# Patient Record
Sex: FEMALE
[De-identification: names, ages, dates, MRNs, and addresses within clinical notes are randomized per-mention and may not be internally consistent; named-entity substitution may affect disease eponyms.]

---

## 2019-08-19 ENCOUNTER — APPOINTMENT (OUTPATIENT)
Dept: NEUROLOGY | Facility: CLINIC | Age: 29
End: 2019-08-19
Payer: COMMERCIAL

## 2019-08-19 ENCOUNTER — LABORATORY RESULT (OUTPATIENT)
Age: 29
End: 2019-08-19

## 2019-08-19 VITALS
SYSTOLIC BLOOD PRESSURE: 116 MMHG | OXYGEN SATURATION: 99 % | WEIGHT: 132 LBS | HEIGHT: 62 IN | DIASTOLIC BLOOD PRESSURE: 82 MMHG | BODY MASS INDEX: 24.29 KG/M2 | HEART RATE: 92 BPM

## 2019-08-19 PROCEDURE — 99205 OFFICE O/P NEW HI 60 MIN: CPT

## 2019-08-19 PROCEDURE — 99354: CPT

## 2019-08-21 LAB
25(OH)D3 SERPL-MCNC: 19.9 NG/ML
ACE BLD-CCNC: 28 U/L
ALBUMIN SERPL ELPH-MCNC: 4.7 G/DL
ALP BLD-CCNC: 59 U/L
ALT SERPL-CCNC: 11 U/L
ANA SER IF-ACNC: NEGATIVE
ANION GAP SERPL CALC-SCNC: 11 MMOL/L
APTT BLD: 32 SEC
AST SERPL-CCNC: 12 U/L
B BURGDOR IGG+IGM SER QL IB: NORMAL
BASOPHILS # BLD AUTO: 0.06 K/UL
BASOPHILS NFR BLD AUTO: 0.7 %
BILIRUB SERPL-MCNC: 0.3 MG/DL
BUN SERPL-MCNC: 11 MG/DL
CALCIUM SERPL-MCNC: 9.7 MG/DL
CHLORIDE SERPL-SCNC: 105 MMOL/L
CO2 SERPL-SCNC: 26 MMOL/L
CREAT SERPL-MCNC: 0.66 MG/DL
CRP SERPL-MCNC: <0.1 MG/DL
DSDNA AB SER-ACNC: <12 IU/ML
ENA SS-A AB SER IA-ACNC: <0.2 AL
ENA SS-B AB SER IA-ACNC: <0.2 AL
EOSINOPHIL # BLD AUTO: 0.19 K/UL
EOSINOPHIL NFR BLD AUTO: 2.2 %
ERYTHROCYTE [SEDIMENTATION RATE] IN BLOOD BY WESTERGREN METHOD: 9 MM/HR
FOLATE SERPL-MCNC: 8.5 NG/ML
GLUCOSE SERPL-MCNC: 106 MG/DL
HCT VFR BLD CALC: 34.4 %
HGB BLD-MCNC: 9.9 G/DL
IMM GRANULOCYTES NFR BLD AUTO: 0.3 %
INR PPP: 0.92 RATIO
LYMPHOCYTES # BLD AUTO: 2.17 K/UL
LYMPHOCYTES NFR BLD AUTO: 24.9 %
MAN DIFF?: NORMAL
MCHC RBC-ENTMCNC: 19.7 PG
MCHC RBC-ENTMCNC: 28.8 GM/DL
MCV RBC AUTO: 68.4 FL
MONOCYTES # BLD AUTO: 0.46 K/UL
MONOCYTES NFR BLD AUTO: 5.3 %
NEUTROPHILS # BLD AUTO: 5.82 K/UL
NEUTROPHILS NFR BLD AUTO: 66.6 %
PLATELET # BLD AUTO: 298 K/UL
POTASSIUM SERPL-SCNC: 4.3 MMOL/L
PROT SERPL-MCNC: 7.6 G/DL
PT BLD: 10.5 SEC
RBC # BLD: 5.03 M/UL
RBC # FLD: 16.9 %
SODIUM SERPL-SCNC: 142 MMOL/L
TSH SERPL-ACNC: 1.32 UIU/ML
VIT B12 SERPL-MCNC: 309 PG/ML
VZV AB TITR SER: POSITIVE
VZV IGG SER IF-ACNC: 1614 INDEX
WBC # FLD AUTO: 8.73 K/UL

## 2019-08-22 LAB
M TB IFN-G BLD-IMP: NEGATIVE
QUANTIFERON TB PLUS MITOGEN MINUS NIL: >10 IU/ML
QUANTIFERON TB PLUS NIL: 0.02 IU/ML
QUANTIFERON TB PLUS TB1 MINUS NIL: 0 IU/ML
QUANTIFERON TB PLUS TB2 MINUS NIL: 0 IU/ML

## 2019-08-23 LAB — AQP4 H2O CHANNEL AB SERPL IA-ACNC: NEGATIVE

## 2019-08-30 ENCOUNTER — TRANSCRIPTION ENCOUNTER (OUTPATIENT)
Age: 29
End: 2019-08-30

## 2019-09-03 LAB
JCV INDEX: 3.87
MISCELLANEOUS TEST: NORMAL
PROC NAME: NORMAL
STRATIFY JCV ANTIBODY: POSITIVE

## 2019-09-04 ENCOUNTER — FORM ENCOUNTER (OUTPATIENT)
Age: 29
End: 2019-09-04

## 2019-09-05 ENCOUNTER — OUTPATIENT (OUTPATIENT)
Dept: OUTPATIENT SERVICES | Facility: HOSPITAL | Age: 29
LOS: 1 days | End: 2019-09-05

## 2019-09-05 ENCOUNTER — APPOINTMENT (OUTPATIENT)
Dept: MRI IMAGING | Facility: CLINIC | Age: 29
End: 2019-09-05
Payer: COMMERCIAL

## 2019-09-05 PROCEDURE — 72157 MRI CHEST SPINE W/O & W/DYE: CPT | Mod: 26

## 2019-09-05 PROCEDURE — 72156 MRI NECK SPINE W/O & W/DYE: CPT | Mod: 26

## 2020-06-25 ENCOUNTER — APPOINTMENT (OUTPATIENT)
Dept: MRI IMAGING | Facility: CLINIC | Age: 30
End: 2020-06-25
Payer: COMMERCIAL

## 2020-06-25 ENCOUNTER — OUTPATIENT (OUTPATIENT)
Dept: OUTPATIENT SERVICES | Facility: HOSPITAL | Age: 30
LOS: 1 days | End: 2020-06-25

## 2020-06-25 ENCOUNTER — APPOINTMENT (OUTPATIENT)
Dept: MRI IMAGING | Facility: CLINIC | Age: 30
End: 2020-06-25

## 2020-06-25 ENCOUNTER — APPOINTMENT (OUTPATIENT)
Dept: INTERNAL MEDICINE | Facility: CLINIC | Age: 30
End: 2020-06-25

## 2020-06-25 ENCOUNTER — RESULT REVIEW (OUTPATIENT)
Age: 30
End: 2020-06-25

## 2020-06-25 PROCEDURE — 70553 MRI BRAIN STEM W/O & W/DYE: CPT | Mod: 26

## 2020-06-26 ENCOUNTER — APPOINTMENT (OUTPATIENT)
Dept: INTERNAL MEDICINE | Facility: CLINIC | Age: 30
End: 2020-06-26
Payer: COMMERCIAL

## 2020-06-26 ENCOUNTER — LABORATORY RESULT (OUTPATIENT)
Age: 30
End: 2020-06-26

## 2020-06-26 ENCOUNTER — APPOINTMENT (OUTPATIENT)
Dept: NEUROLOGY | Facility: CLINIC | Age: 30
End: 2020-06-26
Payer: COMMERCIAL

## 2020-06-26 VITALS
HEIGHT: 62 IN | DIASTOLIC BLOOD PRESSURE: 77 MMHG | OXYGEN SATURATION: 99 % | HEART RATE: 111 BPM | BODY MASS INDEX: 24.11 KG/M2 | SYSTOLIC BLOOD PRESSURE: 112 MMHG | TEMPERATURE: 98 F | WEIGHT: 131 LBS

## 2020-06-26 VITALS
SYSTOLIC BLOOD PRESSURE: 121 MMHG | TEMPERATURE: 98 F | HEART RATE: 95 BPM | HEIGHT: 62 IN | DIASTOLIC BLOOD PRESSURE: 76 MMHG | WEIGHT: 131 LBS | OXYGEN SATURATION: 100 % | BODY MASS INDEX: 24.11 KG/M2

## 2020-06-26 DIAGNOSIS — Z11.59 ENCOUNTER FOR SCREENING FOR OTHER VIRAL DISEASES: ICD-10-CM

## 2020-06-26 PROCEDURE — 99385 PREV VISIT NEW AGE 18-39: CPT | Mod: 25

## 2020-06-26 PROCEDURE — 36415 COLL VENOUS BLD VENIPUNCTURE: CPT

## 2020-06-26 PROCEDURE — 99215 OFFICE O/P EST HI 40 MIN: CPT

## 2020-06-26 NOTE — HISTORY OF PRESENT ILLNESS
[de-identified] : 2019: dizziness, blurry vision after exercise and hot shower.\par \par New L hand weakness 2 weeks ago. Yesterday after carrying heavy bag, pain in L hand. Also tingling in posterior calves after shower. Also recently L hand seems subtlely weaker [FreeTextEntry1] : Establish care, needs referrals

## 2020-06-26 NOTE — HISTORY OF PRESENT ILLNESS
[FreeTextEntry1] : Initial hx 8/2019\par 5-6y ago - while in school, noted asymptomatic L optic pallor, found OCT thinning. Never had prolonged episode of visual impairment. For past several months, transient hazy vision out of L eye after showering or when it's hot outside. Believes this happened several years ago when working out. this lasts about 30 min at a time, improves when it cools down. \par While vacationing in [facility] 1.5 months ago, saw neurologist and had MRI brain which showed "active lesion".\par 2 months of imbalance and generalized weakness. \par Some feeling of incomplete emptying of bladder.\par Occ deliberate speech.\par 6/2020 - Feeling like fine motor skills are off in L hand for 2-3wks. Some weakness of the left hand. She is left hand. No trouble walking. No BB dysfunction.\par \par \par Subj interval:\par \par 6/2020 - Feeling like fine motor skills are off in L hand for 2-3wks. Some weakness of the left hand. She is left hand. No trouble walking. No BB dysfunction.\par \par Occ paresthesias in LLE.\par \par PMHX:\par none\par \par MEDS:\par none\par \par FHx: sister had episode of acute vision loss and got steroids and improved, found to have brain "plaques".\par \par O:\par \par AO3. Normally conversant. Follows commands, names, and repeats. Good attention.\par \par PERRL, VFF, EOMI, no nystagmus, face symmetric, TUP at midline.\par \par Motor: \par    R:  L:\par Del   5  5\par Bi   5  5\par Tri   5  5\par Wrist Extensors  5  5\par Finger abductors  5  5\par    5  5 \par \par HF   5  5\par KE   5  5\par KF   5  5\par DF   5  5\par PF   5  5\par \par Tone   R  L\par UE   0  0 \par LE   0  0\par \par Sensory  RUE  LUE  RLE LLE \par LT   +  +  +  +\par Vib   +  +  +  +\par JPS   +  +  +  +\par PP   +  +  +  +\par Temp   +  +  +  +\par \par Reflexes:\par    R  L  \par Biceps   2  2\par BR   2  2\par Triceps  2  2\par Pat   2  2 \par AJ   2  2\par \par TOES   F  F\par \par Coordination:\par    R  L  \par FTN   0  0 \par ЮЛИЯ   0  0\par HTS   0  0 \par \par Other     \par  \par Gait: normal, can heel, toe, tandem\par \par   Assistance: none\par \par \par MRI brain 7/2019 - at least one DEBORA lesion, one occipital horn PV lesion, and one clear L pontine brain lesion. Pontine lesion was read as having enhancement but I'm not sure as this may be artifact. reviewed with neuroradiology and agree that L pontine lesion is not clearly jamie+.\par \par MRI brain 6/2020 - unchanged.\par \par MRI C+T spine 9/2019 - left posterior jamie-negative t2h at T10. no other cord lesions.\par \par \par AP: Likely mild MS, based on subtle optic neuropathy starting several years ago, episode of imbalance that may be CIS, and MRI meeting DIS (DEBORA, PV, SC, and PF).\par \par New episode of subtle L arm motor impairment could be indicative of new relapse, though exam was normal.\par \par All questions answered, education provided, discussed management plan at length.\par \par - vitD 5ku daily\par - MRI C+T\par - RTC after MRIs

## 2020-06-26 NOTE — HISTORY OF PRESENT ILLNESS
[de-identified] : 2019: dizziness, blurry vision after exercise and hot shower.\par \par New L hand weakness 2 weeks ago. Yesterday after carrying heavy bag, pain in L hand. Also tingling in posterior calves after shower. Also recently L hand seems subtlely weaker [FreeTextEntry1] : Establish care, needs referrals

## 2020-06-30 ENCOUNTER — RESULT REVIEW (OUTPATIENT)
Age: 30
End: 2020-06-30

## 2020-07-24 ENCOUNTER — RESULT REVIEW (OUTPATIENT)
Age: 30
End: 2020-07-24

## 2020-07-24 ENCOUNTER — OUTPATIENT (OUTPATIENT)
Dept: OUTPATIENT SERVICES | Facility: HOSPITAL | Age: 30
LOS: 1 days | End: 2020-07-24

## 2020-07-24 ENCOUNTER — APPOINTMENT (OUTPATIENT)
Dept: MRI IMAGING | Facility: CLINIC | Age: 30
End: 2020-07-24
Payer: COMMERCIAL

## 2020-07-24 PROCEDURE — 72156 MRI NECK SPINE W/O & W/DYE: CPT | Mod: 26

## 2020-07-24 PROCEDURE — 72157 MRI CHEST SPINE W/O & W/DYE: CPT | Mod: 26

## 2020-08-03 LAB
25(OH)D3 SERPL-MCNC: 23.2 NG/ML
ALBUMIN SERPL ELPH-MCNC: 4.5 G/DL
ALP BLD-CCNC: 51 U/L
ALT SERPL-CCNC: 14 U/L
ANA SER IF-ACNC: NEGATIVE
ANION GAP SERPL CALC-SCNC: 14 MMOL/L
APPEARANCE: CLEAR
AST SERPL-CCNC: 14 U/L
BACTERIA: ABNORMAL
BASOPHILS # BLD AUTO: 0.2 K/UL
BASOPHILS NFR BLD AUTO: 2.7 %
BILIRUB SERPL-MCNC: 0.3 MG/DL
BILIRUBIN URINE: NEGATIVE
BLOOD URINE: NEGATIVE
BUN SERPL-MCNC: 11 MG/DL
CALCIUM SERPL-MCNC: 9.2 MG/DL
CHLORIDE SERPL-SCNC: 106 MMOL/L
CHOLEST SERPL-MCNC: 152 MG/DL
CHOLEST/HDLC SERPL: 2.5 RATIO
CO2 SERPL-SCNC: 20 MMOL/L
COLOR: NORMAL
CREAT SERPL-MCNC: 0.66 MG/DL
CRP SERPL-MCNC: <0.1 MG/DL
EOSINOPHIL # BLD AUTO: 0.26 K/UL
EOSINOPHIL NFR BLD AUTO: 3.5 %
ERYTHROCYTE [SEDIMENTATION RATE] IN BLOOD BY WESTERGREN METHOD: 10 MM/HR
ESTIMATED AVERAGE GLUCOSE: 100 MG/DL
FERRITIN SERPL-MCNC: 4 NG/ML
FOLATE SERPL-MCNC: 7.8 NG/ML
GLUCOSE QUALITATIVE U: NEGATIVE
GLUCOSE SERPL-MCNC: 87 MG/DL
HBA1C MFR BLD HPLC: 5.1 %
HCT VFR BLD CALC: 30.4 %
HDLC SERPL-MCNC: 62 MG/DL
HGB BLD-MCNC: 8.3 G/DL
HYALINE CASTS: 2 /LPF
IRON SATN MFR SERPL: 2 %
IRON SERPL-MCNC: 11 UG/DL
KETONES URINE: ABNORMAL
LDLC SERPL CALC-MCNC: 79 MG/DL
LEUKOCYTE ESTERASE URINE: NEGATIVE
LYMPHOCYTES # BLD AUTO: 1.37 K/UL
LYMPHOCYTES NFR BLD AUTO: 18.4 %
MAN DIFF?: NORMAL
MCHC RBC-ENTMCNC: 18.5 PG
MCHC RBC-ENTMCNC: 27.3 GM/DL
MCV RBC AUTO: 67.9 FL
MICROSCOPIC-UA: NORMAL
MONOCYTES # BLD AUTO: 0.19 K/UL
MONOCYTES NFR BLD AUTO: 2.6 %
NEUTROPHILS # BLD AUTO: 5.44 K/UL
NEUTROPHILS NFR BLD AUTO: 72.8 %
NITRITE URINE: NEGATIVE
PH URINE: 5.5
PLATELET # BLD AUTO: 231 K/UL
POTASSIUM SERPL-SCNC: 4.2 MMOL/L
PROT SERPL-MCNC: 7.1 G/DL
PROTEIN URINE: NEGATIVE
RBC # BLD: 4.48 M/UL
RBC # FLD: 17.7 %
RED BLOOD CELLS URINE: 2 /HPF
RHEUMATOID FACT SER QL: <10 IU/ML
SARS-COV-2 IGG SERPL IA-ACNC: 0.11 INDEX
SARS-COV-2 IGG SERPL QL IA: NEGATIVE
SODIUM SERPL-SCNC: 140 MMOL/L
SPECIFIC GRAVITY URINE: 1.02
SQUAMOUS EPITHELIAL CELLS: 4 /HPF
THYROGLOB AB SERPL-ACNC: <20 IU/ML
THYROPEROXIDASE AB SERPL IA-ACNC: <10 IU/ML
TIBC SERPL-MCNC: 445 UG/DL
TRIGL SERPL-MCNC: 54 MG/DL
TSH SERPL-ACNC: 0.81 UIU/ML
UIBC SERPL-MCNC: 435 UG/DL
UROBILINOGEN URINE: NORMAL
VIT B12 SERPL-MCNC: 242 PG/ML
WBC # FLD AUTO: 7.47 K/UL
WHITE BLOOD CELLS URINE: 3 /HPF

## 2021-10-13 ENCOUNTER — APPOINTMENT (OUTPATIENT)
Dept: INTERNAL MEDICINE | Facility: CLINIC | Age: 31
End: 2021-10-13

## 2021-11-02 ENCOUNTER — TRANSCRIPTION ENCOUNTER (OUTPATIENT)
Age: 31
End: 2021-11-02

## 2021-11-10 ENCOUNTER — APPOINTMENT (OUTPATIENT)
Dept: INTERNAL MEDICINE | Facility: CLINIC | Age: 31
End: 2021-11-10
Payer: COMMERCIAL

## 2021-11-10 VITALS
WEIGHT: 134 LBS | BODY MASS INDEX: 24.66 KG/M2 | SYSTOLIC BLOOD PRESSURE: 112 MMHG | HEIGHT: 62 IN | DIASTOLIC BLOOD PRESSURE: 75 MMHG | OXYGEN SATURATION: 99 % | HEART RATE: 89 BPM | TEMPERATURE: 98.3 F

## 2021-11-10 DIAGNOSIS — Z82.49 FAMILY HISTORY OF ISCHEMIC HEART DISEASE AND OTHER DISEASES OF THE CIRCULATORY SYSTEM: ICD-10-CM

## 2021-11-10 DIAGNOSIS — R23.8 OTHER SKIN CHANGES: ICD-10-CM

## 2021-11-10 DIAGNOSIS — Z23 ENCOUNTER FOR IMMUNIZATION: ICD-10-CM

## 2021-11-10 DIAGNOSIS — G37.9 DEMYELINATING DISEASE OF CENTRAL NERVOUS SYSTEM, UNSPECIFIED: ICD-10-CM

## 2021-11-10 DIAGNOSIS — R29.898 OTHER SYMPTOMS AND SIGNS INVOLVING THE MUSCULOSKELETAL SYSTEM: ICD-10-CM

## 2021-11-10 DIAGNOSIS — Z82.0 FAMILY HISTORY OF EPILEPSY AND OTHER DISEASES OF THE NERVOUS SYSTEM: ICD-10-CM

## 2021-11-10 DIAGNOSIS — Z78.9 OTHER SPECIFIED HEALTH STATUS: ICD-10-CM

## 2021-11-10 PROCEDURE — 99395 PREV VISIT EST AGE 18-39: CPT | Mod: 25

## 2021-11-10 PROCEDURE — 36415 COLL VENOUS BLD VENIPUNCTURE: CPT

## 2021-11-10 PROCEDURE — G0008: CPT

## 2021-11-10 PROCEDURE — 90686 IIV4 VACC NO PRSV 0.5 ML IM: CPT

## 2021-11-24 ENCOUNTER — APPOINTMENT (OUTPATIENT)
Dept: MRI IMAGING | Facility: CLINIC | Age: 31
End: 2021-11-24
Payer: COMMERCIAL

## 2021-11-24 ENCOUNTER — MED ADMIN CHARGE (OUTPATIENT)
Age: 31
End: 2021-11-24

## 2021-11-24 ENCOUNTER — APPOINTMENT (OUTPATIENT)
Dept: INTERNAL MEDICINE | Facility: CLINIC | Age: 31
End: 2021-11-24
Payer: COMMERCIAL

## 2021-11-24 ENCOUNTER — OUTPATIENT (OUTPATIENT)
Dept: OUTPATIENT SERVICES | Facility: HOSPITAL | Age: 31
LOS: 1 days | End: 2021-11-24

## 2021-11-24 ENCOUNTER — TRANSCRIPTION ENCOUNTER (OUTPATIENT)
Age: 31
End: 2021-11-24

## 2021-11-24 DIAGNOSIS — Z23 ENCOUNTER FOR IMMUNIZATION: ICD-10-CM

## 2021-11-24 LAB
25(OH)D3 SERPL-MCNC: 21.1 NG/ML
ALBUMIN SERPL ELPH-MCNC: 4.4 G/DL
ALP BLD-CCNC: 57 U/L
ALT SERPL-CCNC: 11 U/L
ANION GAP SERPL CALC-SCNC: 12 MMOL/L
APPEARANCE: CLEAR
APTT BLD: 32.5 SEC
AST SERPL-CCNC: 16 U/L
BACTERIA: ABNORMAL
BASOPHILS # BLD AUTO: 0.06 K/UL
BASOPHILS NFR BLD AUTO: 0.7 %
BILIRUB SERPL-MCNC: 0.4 MG/DL
BILIRUBIN URINE: NEGATIVE
BLOOD URINE: NEGATIVE
BUN SERPL-MCNC: 8 MG/DL
CALCIUM SERPL-MCNC: 9.2 MG/DL
CHLORIDE SERPL-SCNC: 103 MMOL/L
CHOLEST SERPL-MCNC: 172 MG/DL
CO2 SERPL-SCNC: 22 MMOL/L
COLOR: NORMAL
CREAT SERPL-MCNC: 0.59 MG/DL
CRP SERPL-MCNC: <3 MG/L
EOSINOPHIL # BLD AUTO: 0.2 K/UL
EOSINOPHIL NFR BLD AUTO: 2.4 %
ERYTHROCYTE [SEDIMENTATION RATE] IN BLOOD BY WESTERGREN METHOD: 7 MM/HR
ESTIMATED AVERAGE GLUCOSE: 97 MG/DL
FERRITIN SERPL-MCNC: 3 NG/ML
FOLATE SERPL-MCNC: 11.6 NG/ML
GLUCOSE QUALITATIVE U: NEGATIVE
GLUCOSE SERPL-MCNC: 80 MG/DL
HBA1C MFR BLD HPLC: 5 %
HCT VFR BLD CALC: 28.9 %
HDLC SERPL-MCNC: 77 MG/DL
HGB BLD-MCNC: 7.8 G/DL
HYALINE CASTS: 2 /LPF
IMM GRANULOCYTES NFR BLD AUTO: 0.4 %
INR PPP: 1.05 RATIO
IRON SATN MFR SERPL: 4 %
IRON SERPL-MCNC: 16 UG/DL
KETONES URINE: NORMAL
LDLC SERPL CALC-MCNC: 85 MG/DL
LEUKOCYTE ESTERASE URINE: NEGATIVE
LYMPHOCYTES # BLD AUTO: 2.11 K/UL
LYMPHOCYTES NFR BLD AUTO: 25 %
MAN DIFF?: NORMAL
MCHC RBC-ENTMCNC: 17.6 PG
MCHC RBC-ENTMCNC: 27 GM/DL
MCV RBC AUTO: 65.1 FL
MICROSCOPIC-UA: NORMAL
MONOCYTES # BLD AUTO: 0.53 K/UL
MONOCYTES NFR BLD AUTO: 6.3 %
NEUTROPHILS # BLD AUTO: 5.52 K/UL
NEUTROPHILS NFR BLD AUTO: 65.2 %
NITRITE URINE: NEGATIVE
NONHDLC SERPL-MCNC: 94 MG/DL
PH URINE: 6
PLATELET # BLD AUTO: 289 K/UL
POTASSIUM SERPL-SCNC: 3.9 MMOL/L
PROT SERPL-MCNC: 7 G/DL
PROTEIN URINE: NEGATIVE
PT BLD: 12.4 SEC
RBC # BLD: 4.44 M/UL
RBC # FLD: 20.6 %
RED BLOOD CELLS URINE: 1 /HPF
SODIUM SERPL-SCNC: 136 MMOL/L
SPECIFIC GRAVITY URINE: 1.02
SQUAMOUS EPITHELIAL CELLS: 5 /HPF
TIBC SERPL-MCNC: 424 UG/DL
TRIGL SERPL-MCNC: 48 MG/DL
TSH SERPL-ACNC: 1.1 UIU/ML
UIBC SERPL-MCNC: 408 UG/DL
UROBILINOGEN URINE: NORMAL
VIT B12 SERPL-MCNC: 272 PG/ML
WBC # FLD AUTO: 8.45 K/UL
WHITE BLOOD CELLS URINE: 2 /HPF

## 2021-11-24 PROCEDURE — 72156 MRI NECK SPINE W/O & W/DYE: CPT | Mod: 26

## 2021-11-24 PROCEDURE — 72157 MRI CHEST SPINE W/O & W/DYE: CPT | Mod: 26

## 2021-11-24 PROCEDURE — 70553 MRI BRAIN STEM W/O & W/DYE: CPT | Mod: 26

## 2021-11-24 PROCEDURE — 90715 TDAP VACCINE 7 YRS/> IM: CPT

## 2021-11-24 PROCEDURE — 90471 IMMUNIZATION ADMIN: CPT

## 2021-11-29 ENCOUNTER — NON-APPOINTMENT (OUTPATIENT)
Age: 31
End: 2021-11-29

## 2022-02-15 ENCOUNTER — APPOINTMENT (OUTPATIENT)
Dept: OBGYN | Facility: CLINIC | Age: 32
End: 2022-02-15

## 2022-04-06 ENCOUNTER — APPOINTMENT (OUTPATIENT)
Dept: DERMATOLOGY | Facility: CLINIC | Age: 32
End: 2022-04-06
Payer: COMMERCIAL

## 2022-04-06 ENCOUNTER — LABORATORY RESULT (OUTPATIENT)
Age: 32
End: 2022-04-06

## 2022-04-06 DIAGNOSIS — D23.9 OTHER BENIGN NEOPLASM OF SKIN, UNSPECIFIED: ICD-10-CM

## 2022-04-06 DIAGNOSIS — Z12.83 ENCOUNTER FOR SCREENING FOR MALIGNANT NEOPLASM OF SKIN: ICD-10-CM

## 2022-04-06 DIAGNOSIS — D22.9 MELANOCYTIC NEVI, UNSPECIFIED: ICD-10-CM

## 2022-04-06 DIAGNOSIS — D48.7 NEOPLASM OF UNCERTAIN BEHAVIOR OF OTHER SPECIFIED SITES: ICD-10-CM

## 2022-04-06 PROCEDURE — 11102 TANGNTL BX SKIN SINGLE LES: CPT

## 2022-04-06 PROCEDURE — 99203 OFFICE O/P NEW LOW 30 MIN: CPT | Mod: 25

## 2022-04-06 NOTE — PHYSICAL EXAM
[Alert] : alert [Oriented x 3] : ~L oriented x 3 [Well Nourished] : well nourished [Conjunctiva Non-injected] : conjunctiva non-injected [No Visual Lymphadenopathy] : no visual  lymphadenopathy [No Clubbing] : no clubbing [No Edema] : no edema [No Bromhidrosis] : no bromhidrosis [No Chromhidrosis] : no chromhidrosis [Full Body Skin Exam Performed] : performed [FreeTextEntry3] : white skin\par numerous brown macules and papules\par L thigh white scar without pigment\par buttocks without appreciable hypopigmentation\par medial lower back asymmetric oval brown papule\par

## 2022-04-06 NOTE — HISTORY OF PRESENT ILLNESS
[FreeTextEntry1] : moles [de-identified] : new patient\par DN L thigh shave removal a few years ago, not excised (but told to FU if grows back)\par spot on buttock that wont tan\par no FH melanoma

## 2022-04-20 ENCOUNTER — NON-APPOINTMENT (OUTPATIENT)
Age: 32
End: 2022-04-20

## 2022-04-24 ENCOUNTER — TRANSCRIPTION ENCOUNTER (OUTPATIENT)
Age: 32
End: 2022-04-24

## 2023-01-19 RX ORDER — CHLORHEXIDINE GLUCONATE 4 %
325 (65 FE) LIQUID (ML) TOPICAL
Qty: 180 | Refills: 1 | Status: ACTIVE | COMMUNITY
Start: 2019-09-19 | End: 1900-01-01

## 2023-03-06 ENCOUNTER — APPOINTMENT (OUTPATIENT)
Dept: MRI IMAGING | Facility: CLINIC | Age: 33
End: 2023-03-06
Payer: COMMERCIAL

## 2023-03-06 ENCOUNTER — OUTPATIENT (OUTPATIENT)
Dept: OUTPATIENT SERVICES | Facility: HOSPITAL | Age: 33
LOS: 1 days | End: 2023-03-06

## 2023-03-06 PROCEDURE — 72141 MRI NECK SPINE W/O DYE: CPT | Mod: 26

## 2023-03-06 PROCEDURE — 70551 MRI BRAIN STEM W/O DYE: CPT | Mod: 26

## 2023-03-06 PROCEDURE — 72146 MRI CHEST SPINE W/O DYE: CPT | Mod: 26

## 2023-03-09 ENCOUNTER — NON-APPOINTMENT (OUTPATIENT)
Age: 33
End: 2023-03-09

## 2023-03-15 ENCOUNTER — APPOINTMENT (OUTPATIENT)
Dept: INTERNAL MEDICINE | Facility: CLINIC | Age: 33
End: 2023-03-15
Payer: COMMERCIAL

## 2023-03-15 ENCOUNTER — NON-APPOINTMENT (OUTPATIENT)
Age: 33
End: 2023-03-15

## 2023-03-15 VITALS
OXYGEN SATURATION: 96 % | TEMPERATURE: 98.4 F | HEART RATE: 85 BPM | SYSTOLIC BLOOD PRESSURE: 125 MMHG | WEIGHT: 129 LBS | DIASTOLIC BLOOD PRESSURE: 76 MMHG | HEIGHT: 62 IN | BODY MASS INDEX: 23.74 KG/M2

## 2023-03-15 DIAGNOSIS — H46.9 UNSPECIFIED OPTIC NEURITIS: ICD-10-CM

## 2023-03-15 PROCEDURE — 99395 PREV VISIT EST AGE 18-39: CPT | Mod: 25

## 2023-03-15 PROCEDURE — 36415 COLL VENOUS BLD VENIPUNCTURE: CPT

## 2023-03-15 RX ORDER — TRETINOIN 0.25 MG/G
0.03 CREAM TOPICAL
Qty: 1 | Refills: 1 | Status: ACTIVE | COMMUNITY
Start: 2023-03-15 | End: 1900-01-01

## 2023-03-18 ENCOUNTER — APPOINTMENT (OUTPATIENT)
Dept: MRI IMAGING | Facility: CLINIC | Age: 33
End: 2023-03-18

## 2023-03-22 ENCOUNTER — APPOINTMENT (OUTPATIENT)
Dept: NEUROLOGY | Facility: CLINIC | Age: 33
End: 2023-03-22
Payer: COMMERCIAL

## 2023-03-22 ENCOUNTER — NON-APPOINTMENT (OUTPATIENT)
Age: 33
End: 2023-03-22

## 2023-03-22 VITALS
WEIGHT: 129 LBS | SYSTOLIC BLOOD PRESSURE: 109 MMHG | HEART RATE: 98 BPM | TEMPERATURE: 98.3 F | DIASTOLIC BLOOD PRESSURE: 77 MMHG | OXYGEN SATURATION: 100 % | HEIGHT: 62 IN | BODY MASS INDEX: 23.74 KG/M2

## 2023-03-22 PROCEDURE — 99215 OFFICE O/P EST HI 40 MIN: CPT

## 2023-03-22 RX ORDER — VITAMIN K2 90 MCG
125 MCG CAPSULE ORAL
Qty: 30 | Refills: 5 | Status: ACTIVE | COMMUNITY
Start: 2023-03-22 | End: 1900-01-01

## 2023-03-22 RX ORDER — CHLORHEXIDINE GLUCONATE 4 %
1000 LIQUID (ML) TOPICAL DAILY
Qty: 30 | Refills: 3 | Status: ACTIVE | COMMUNITY
Start: 2023-03-22 | End: 1900-01-01

## 2023-03-23 LAB
ANA SER IF-ACNC: NEGATIVE
ENA SS-A AB SER IA-ACNC: <0.2 AL
ENA SS-B AB SER IA-ACNC: <0.2 AL
HBV CORE IGG+IGM SER QL: NONREACTIVE
HBV SURFACE AB SER QL: REACTIVE
HBV SURFACE AG SER QL: NONREACTIVE
VZV AB TITR SER: POSITIVE
VZV IGG SER IF-ACNC: 1289 INDEX

## 2023-03-24 LAB — DSDNA AB SER-ACNC: <12 IU/ML

## 2023-03-24 NOTE — HISTORY OF PRESENT ILLNESS
[FreeTextEntry1] : Initial hx 8/2019\par 5-6y ago - while in school, noted asymptomatic L optic pallor, found OCT thinning. Never had prolonged episode of visual impairment. For past several months, transient hazy vision out of L eye after showering or when it's hot outside. Believes this happened several years ago when working out. this lasts about 30 min at a time, improves when it cools down. \par While vacationing in [facility] 1.5 months ago, saw neurologist and had MRI brain which showed "active lesion".\par 2 months of imbalance and generalized weakness. \par Some feeling of incomplete emptying of bladder.\par Occ deliberate speech.\par 6/2020 - Feeling like fine motor skills are off in L hand for 2-3wks. Some weakness of the left hand. She is left hand. No trouble walking. No BB dysfunction. Spontaneously resolved. MRIs were unchanged.\par 3/2023 - 5d of hazy vision in R eye, some pain upon eye movement. she did oct which was fine. mild, able to read, though "definitely asymmetry", got an eye exam and was 20/20 but a bit hazy. Pt deferred MRI orbits 2/2 cost.\par MRI brain 3/2023 demonstrated one small new lesion.\par \par \par Subj interval:\par \par 3/2023 - several days of hazy vision in R eye, some pain upon eye movement, some difficulty with color desaturation. she did oct which was fine. mild, able to read, though "definitely asymmetry", got an eye exam and was 20/20 but a bit hazy. Improved within 1.5wks spontaneously.\par \par \par PMHX:\par none\par \par MEDS:\par none\par \par FHx: sister had episode of acute vision loss and got steroids and improved, found to have brain "plaques".\par \par \par O:\par \par AO3. Normally conversant. Follows commands, names, and repeats. Good attention.\par \par PERRL, VFF, EOMI, no nystagmus, face symmetric, TUP at midline.\par \par Motor: \par  R: L:\par Del 5 5\par Bi 5 5\par Tri 5 5\par Wrist Extensors 5 5\par Finger abductors 5 5\par  5 5 \par \par HF 5 5\par KE 5 5\par KF 5 5\par DF 5 5\par PF 5 5\par \par Tone R L\par UE 0 0 \par LE 0 0\par \par Sensory RUE LUE RLE LLE \par LT + + + +\par Vib + + + +\par JPS + + + +\par PP + + + +\par Temp + + + +\par \par Reflexes:\par  R L \par Biceps 2 2\par BR 2 2\par Triceps 2 2\par Pat 2 2 \par AJ 2 2\par \par TOES F F\par \par Coordination:\par  R L \par FTN 0 0 \par ЮЛИЯ 0 0\par HTS 0 0 \par \par Other \par  \par Gait: normal, can heel, toe, tandem\par \par  Assistance: none\par \par \par MRI brain 7/2019 - at least one DEBORA lesion, one occipital horn PV lesion, and one clear L pontine brain lesion. Pontine lesion was read as having enhancement but I'm not sure as this may be artifact. reviewed with neuroradiology and agree that L pontine lesion is not clearly jamie+.\par \par MRI brain 6/2020 - unchanged.\par \par MRI C+T spine 9/2019 - left posterior jamie-negative t2h at T10. no other cord lesions.\par \par MRI C+T 7/2020 - unchanged. \par \par MRI brain, C, and T 11/2021 - unchanged on my review. \par \par MRI brain, C, and T 3/2023 - 1 small new L frontal deep wm brain lesion. 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  Episode of subtle L arm motor impairment in 2020 but MRIs without new lesions. MRIs eventually met DIT in 3/2023 with a small new brain lesion. Had episode of R eye visual dysfunction in 3/2023 that may have been optic neuritis.\par \par All questions answered, education provided, discussed management plan at length. reviewed imaging with pt personally.\par \par - pre-DMT blood work.\par - start vitD 5ku daily\par - start b12\par - f/u w/ PMD re: severe iron deficiency anemia\par - RTC 2-3wks, can be virtual

## 2023-03-28 LAB
M TB IFN-G BLD-IMP: NEGATIVE
QUANTIFERON TB PLUS MITOGEN MINUS NIL: 2 IU/ML
QUANTIFERON TB PLUS NIL: 0.07 IU/ML
QUANTIFERON TB PLUS TB1 MINUS NIL: -0.06 IU/ML
QUANTIFERON TB PLUS TB2 MINUS NIL: -0.06 IU/ML

## 2023-03-29 LAB
JCV INDEX: 3.5
STRATIFY JCV ANTIBODY: POSITIVE

## 2023-04-18 ENCOUNTER — TRANSCRIPTION ENCOUNTER (OUTPATIENT)
Age: 33
End: 2023-04-18

## 2023-04-18 LAB
25(OH)D3 SERPL-MCNC: 19.8 NG/ML
ALBUMIN SERPL ELPH-MCNC: 4.6 G/DL
ALP BLD-CCNC: 50 U/L
ALT SERPL-CCNC: 12 U/L
ANION GAP SERPL CALC-SCNC: 12 MMOL/L
ANTI-MUELLERIAN HORMONE: 4.73 NG/ML
APPEARANCE: CLEAR
AST SERPL-CCNC: 14 U/L
BACTERIA: ABNORMAL
BASOPHILS # BLD AUTO: 0.05 K/UL
BASOPHILS NFR BLD AUTO: 0.7 %
BILIRUB SERPL-MCNC: 0.4 MG/DL
BILIRUBIN URINE: NEGATIVE
BLOOD URINE: NEGATIVE
BUN SERPL-MCNC: 11 MG/DL
CALCIUM SERPL-MCNC: 9.7 MG/DL
CHLORIDE SERPL-SCNC: 106 MMOL/L
CHOLEST SERPL-MCNC: 168 MG/DL
CO2 SERPL-SCNC: 21 MMOL/L
COLOR: NORMAL
CREAT SERPL-MCNC: 0.58 MG/DL
CRP SERPL-MCNC: <3 MG/L
EGFR: 123 ML/MIN/1.73M2
EOSINOPHIL # BLD AUTO: 0.25 K/UL
EOSINOPHIL NFR BLD AUTO: 3.5 %
ERYTHROCYTE [SEDIMENTATION RATE] IN BLOOD BY WESTERGREN METHOD: 12 MM/HR
ESTIMATED AVERAGE GLUCOSE: 108 MG/DL
FERRITIN SERPL-MCNC: 4 NG/ML
FOLATE SERPL-MCNC: 13.1 NG/ML
GLUCOSE QUALITATIVE U: NEGATIVE
GLUCOSE SERPL-MCNC: 93 MG/DL
HBA1C MFR BLD HPLC: 5.4 %
HCT VFR BLD CALC: 27.9 %
HDLC SERPL-MCNC: 74 MG/DL
HGB BLD-MCNC: 7.8 G/DL
HYALINE CASTS: 1 /LPF
IMM GRANULOCYTES NFR BLD AUTO: 0.3 %
IRON SATN MFR SERPL: 2 %
IRON SERPL-MCNC: 10 UG/DL
KETONES URINE: NEGATIVE
LDLC SERPL CALC-MCNC: 85 MG/DL
LEUKOCYTE ESTERASE URINE: ABNORMAL
LYMPHOCYTES # BLD AUTO: 2.3 K/UL
LYMPHOCYTES NFR BLD AUTO: 31.8 %
MAN DIFF?: NORMAL
MCHC RBC-ENTMCNC: 17.4 PG
MCHC RBC-ENTMCNC: 28 GM/DL
MCV RBC AUTO: 62.1 FL
MICROSCOPIC-UA: NORMAL
MONOCYTES # BLD AUTO: 0.62 K/UL
MONOCYTES NFR BLD AUTO: 8.6 %
NEUTROPHILS # BLD AUTO: 4 K/UL
NEUTROPHILS NFR BLD AUTO: 55.1 %
NITRITE URINE: NEGATIVE
NONHDLC SERPL-MCNC: 93 MG/DL
PH URINE: 6
PLATELET # BLD AUTO: 248 K/UL
POTASSIUM SERPL-SCNC: 3.9 MMOL/L
PROT SERPL-MCNC: 7 G/DL
PROTEIN URINE: NORMAL
RBC # BLD: 4.49 M/UL
RBC # FLD: 19.4 %
RED BLOOD CELLS URINE: 0 /HPF
SODIUM SERPL-SCNC: 138 MMOL/L
SPECIFIC GRAVITY URINE: 1.03
SQUAMOUS EPITHELIAL CELLS: 15 /HPF
TESTOST SERPL-MCNC: <2.5 NG/DL
TIBC SERPL-MCNC: 450 UG/DL
TRIGL SERPL-MCNC: 43 MG/DL
TSH SERPL-ACNC: 0.86 UIU/ML
UIBC SERPL-MCNC: 441 UG/DL
URINE COMMENTS: NORMAL
UROBILINOGEN URINE: NORMAL
VIT B12 SERPL-MCNC: 265 PG/ML
WBC # FLD AUTO: 7.24 K/UL
WHITE BLOOD CELLS URINE: 3 /HPF

## 2023-04-18 NOTE — REVIEW OF SYSTEMS
[Vision Problems] : vision problems [Headache] : no headache [Dizziness] : no dizziness [Unsteady Walking] : no ataxia [Negative] : Heme/Lymph [de-identified] : L arm weakness, transient

## 2023-04-18 NOTE — PHYSICAL EXAM
[No Acute Distress] : no acute distress [Well Nourished] : well nourished [Well Developed] : well developed [Well-Appearing] : well-appearing [Normal Sclera/Conjunctiva] : normal sclera/conjunctiva [Normal Outer Ear/Nose] : the outer ears and nose were normal in appearance [Normal Oropharynx] : the oropharynx was normal [No JVD] : no jugular venous distention [No Lymphadenopathy] : no lymphadenopathy [Supple] : supple [Thyroid Normal, No Nodules] : the thyroid was normal and there were no nodules present [No Respiratory Distress] : no respiratory distress  [No Accessory Muscle Use] : no accessory muscle use [Clear to Auscultation] : lungs were clear to auscultation bilaterally [Normal Rate] : normal rate  [Regular Rhythm] : with a regular rhythm [Normal S1, S2] : normal S1 and S2 [No Murmur] : no murmur heard [No Edema] : there was no peripheral edema [No Extremity Clubbing/Cyanosis] : no extremity clubbing/cyanosis [Soft] : abdomen soft [Non Tender] : non-tender [Non-distended] : non-distended [No Masses] : no abdominal mass palpated [No HSM] : no HSM [Normal Bowel Sounds] : normal bowel sounds [Normal Posterior Cervical Nodes] : no posterior cervical lymphadenopathy [Normal Anterior Cervical Nodes] : no anterior cervical lymphadenopathy [No CVA Tenderness] : no CVA  tenderness [No Spinal Tenderness] : no spinal tenderness [No Joint Swelling] : no joint swelling [Grossly Normal Strength/Tone] : grossly normal strength/tone [No Rash] : no rash [Coordination Grossly Intact] : coordination grossly intact [Normal Gait] : normal gait [Normal Affect] : the affect was normal [Alert and Oriented x3] : oriented to person, place, and time [Normal Insight/Judgement] : insight and judgment were intact

## 2023-04-18 NOTE — HISTORY OF PRESENT ILLNESS
[FreeTextEntry1] : CPE [de-identified] : A few days of blurry vision (specifically not diplopia). A few months ago some weakness in L hand, which resolved over 2-3 weeks.\par \par menorrhagia, now only heavy for a day or so.

## 2024-04-24 ENCOUNTER — APPOINTMENT (OUTPATIENT)
Dept: INTERNAL MEDICINE | Facility: CLINIC | Age: 34
End: 2024-04-24
Payer: COMMERCIAL

## 2024-04-24 ENCOUNTER — NON-APPOINTMENT (OUTPATIENT)
Age: 34
End: 2024-04-24

## 2024-04-24 VITALS
WEIGHT: 134 LBS | OXYGEN SATURATION: 96 % | BODY MASS INDEX: 24.66 KG/M2 | HEIGHT: 62 IN | TEMPERATURE: 97.7 F | SYSTOLIC BLOOD PRESSURE: 102 MMHG | HEART RATE: 79 BPM | DIASTOLIC BLOOD PRESSURE: 70 MMHG

## 2024-04-24 DIAGNOSIS — D50.9 IRON DEFICIENCY ANEMIA, UNSPECIFIED: ICD-10-CM

## 2024-04-24 DIAGNOSIS — Z13.1 ENCOUNTER FOR SCREENING FOR DIABETES MELLITUS: ICD-10-CM

## 2024-04-24 DIAGNOSIS — N92.0 EXCESSIVE AND FREQUENT MENSTRUATION WITH REGULAR CYCLE: ICD-10-CM

## 2024-04-24 DIAGNOSIS — Z13.220 ENCOUNTER FOR SCREENING FOR LIPOID DISORDERS: ICD-10-CM

## 2024-04-24 DIAGNOSIS — Z00.00 ENCOUNTER FOR GENERAL ADULT MEDICAL EXAMINATION W/OUT ABNORMAL FINDINGS: ICD-10-CM

## 2024-04-24 DIAGNOSIS — Z80.3 FAMILY HISTORY OF MALIGNANT NEOPLASM OF BREAST: ICD-10-CM

## 2024-04-24 DIAGNOSIS — L70.9 ACNE, UNSPECIFIED: ICD-10-CM

## 2024-04-24 DIAGNOSIS — G35 MULTIPLE SCLEROSIS: ICD-10-CM

## 2024-04-24 PROCEDURE — 36415 COLL VENOUS BLD VENIPUNCTURE: CPT

## 2024-04-24 PROCEDURE — 99395 PREV VISIT EST AGE 18-39: CPT

## 2024-04-24 RX ORDER — ADHESIVE TAPE 3"X 2.3 YD
50 MCG TAPE, NON-MEDICATED TOPICAL
Qty: 90 | Refills: 1 | Status: COMPLETED | COMMUNITY
Start: 2019-09-19 | End: 2024-04-24

## 2024-04-24 RX ORDER — B-COMPLEX WITH VITAMIN C
100 TABLET ORAL DAILY
Refills: 0 | Status: ACTIVE | COMMUNITY
Start: 2024-04-24

## 2024-05-07 NOTE — HISTORY OF PRESENT ILLNESS
[FreeTextEntry1] : 34 year old woman presenting for CPE [de-identified] : planning on returning to neuro to have imaging. Last spring had small episode of optic neuritis.  Still having very heavy periods. 2 weeks ago had such heavy bleeding she went through an ultra tampon and pad.  She inquired about breast cancer screening due to mat GM and mother's cousin having breast cancer. We discussed that breast cancer screening for her would still begin at age 40

## 2024-05-11 ENCOUNTER — APPOINTMENT (OUTPATIENT)
Dept: ULTRASOUND IMAGING | Facility: CLINIC | Age: 34
End: 2024-05-11
Payer: COMMERCIAL

## 2024-05-11 ENCOUNTER — OUTPATIENT (OUTPATIENT)
Dept: OUTPATIENT SERVICES | Facility: HOSPITAL | Age: 34
LOS: 1 days | End: 2024-05-11

## 2024-05-11 PROCEDURE — 76830 TRANSVAGINAL US NON-OB: CPT | Mod: 26

## 2024-05-12 ENCOUNTER — TRANSCRIPTION ENCOUNTER (OUTPATIENT)
Age: 34
End: 2024-05-12

## 2024-05-13 LAB
25(OH)D3 SERPL-MCNC: 22.9 NG/ML
ALBUMIN SERPL ELPH-MCNC: 4.4 G/DL
ALP BLD-CCNC: 59 U/L
ALT SERPL-CCNC: 8 U/L
ANION GAP SERPL CALC-SCNC: 9 MMOL/L
ANTI-MUELLERIAN HORMONE: 3.03 NG/ML
APPEARANCE: CLEAR
AST SERPL-CCNC: 13 U/L
BACTERIA: ABNORMAL /HPF
BASOPHILS # BLD AUTO: 0.05 K/UL
BASOPHILS NFR BLD AUTO: 0.5 %
BILIRUB SERPL-MCNC: 0.4 MG/DL
BILIRUBIN URINE: NEGATIVE
BLOOD URINE: NEGATIVE
BUN SERPL-MCNC: 8 MG/DL
CALCIUM SERPL-MCNC: 9.3 MG/DL
CAST: 0 /LPF
CHLORIDE SERPL-SCNC: 103 MMOL/L
CHOLEST SERPL-MCNC: 197 MG/DL
CO2 SERPL-SCNC: 26 MMOL/L
COLOR: YELLOW
CREAT SERPL-MCNC: 0.68 MG/DL
EGFR: 117 ML/MIN/1.73M2
EOSINOPHIL # BLD AUTO: 0.14 K/UL
EOSINOPHIL NFR BLD AUTO: 1.4 %
EPITHELIAL CELLS: 6 /HPF
ESTIMATED AVERAGE GLUCOSE: 103 MG/DL
FERRITIN SERPL-MCNC: 8 NG/ML
FOLATE SERPL-MCNC: 9.3 NG/ML
GLUCOSE QUALITATIVE U: NEGATIVE MG/DL
GLUCOSE SERPL-MCNC: 81 MG/DL
HBA1C MFR BLD HPLC: 5.2 %
HCT VFR BLD CALC: 40.7 %
HDLC SERPL-MCNC: 87 MG/DL
HGB BLD-MCNC: 12.2 G/DL
IMM GRANULOCYTES NFR BLD AUTO: 0.5 %
IRON SATN MFR SERPL: 7 %
IRON SERPL-MCNC: 32 UG/DL
KETONES URINE: NEGATIVE MG/DL
LDLC SERPL CALC-MCNC: 103 MG/DL
LEUKOCYTE ESTERASE URINE: NEGATIVE
LYMPHOCYTES # BLD AUTO: 2.01 K/UL
LYMPHOCYTES NFR BLD AUTO: 20.4 %
MAN DIFF?: NORMAL
MCHC RBC-ENTMCNC: 23.3 PG
MCHC RBC-ENTMCNC: 30 GM/DL
MCV RBC AUTO: 77.7 FL
MICROSCOPIC-UA: NORMAL
MONOCYTES # BLD AUTO: 0.56 K/UL
MONOCYTES NFR BLD AUTO: 5.7 %
NEUTROPHILS # BLD AUTO: 7.02 K/UL
NEUTROPHILS NFR BLD AUTO: 71.5 %
NITRITE URINE: NEGATIVE
NONHDLC SERPL-MCNC: 110 MG/DL
PH URINE: 6.5
PLATELET # BLD AUTO: 280 K/UL
POTASSIUM SERPL-SCNC: 3.8 MMOL/L
PROT SERPL-MCNC: 6.8 G/DL
PROTEIN URINE: NEGATIVE MG/DL
RBC # BLD: 5.24 M/UL
RBC # FLD: 19.1 %
RED BLOOD CELLS URINE: 1 /HPF
SODIUM SERPL-SCNC: 138 MMOL/L
SPECIFIC GRAVITY URINE: 1.02
TESTOST SERPL-MCNC: 17.4 NG/DL
TIBC SERPL-MCNC: 438 UG/DL
TRIGL SERPL-MCNC: 33 MG/DL
TSH SERPL-ACNC: 0.65 UIU/ML
UIBC SERPL-MCNC: 407 UG/DL
UROBILINOGEN URINE: 0.2 MG/DL
VIT B12 SERPL-MCNC: 355 PG/ML
WBC # FLD AUTO: 9.83 K/UL
WHITE BLOOD CELLS URINE: 1 /HPF

## 2024-05-21 ENCOUNTER — TRANSCRIPTION ENCOUNTER (OUTPATIENT)
Age: 34
End: 2024-05-21

## 2024-06-12 ENCOUNTER — APPOINTMENT (OUTPATIENT)
Dept: OBGYN | Facility: CLINIC | Age: 34
End: 2024-06-12

## 2024-06-12 VITALS
HEART RATE: 89 BPM | RESPIRATION RATE: 18 BRPM | SYSTOLIC BLOOD PRESSURE: 125 MMHG | DIASTOLIC BLOOD PRESSURE: 81 MMHG | OXYGEN SATURATION: 98 %

## 2024-06-12 VITALS — WEIGHT: 134 LBS | BODY MASS INDEX: 26.31 KG/M2 | HEIGHT: 60 IN

## 2024-06-12 DIAGNOSIS — Z12.39 ENCOUNTER FOR OTHER SCREENING FOR MALIGNANT NEOPLASM OF BREAST: ICD-10-CM

## 2024-06-12 DIAGNOSIS — Z01.419 ENCOUNTER FOR GYNECOLOGICAL EXAMINATION (GENERAL) (ROUTINE) W/OUT ABNORMAL FINDINGS: ICD-10-CM

## 2024-06-12 DIAGNOSIS — Z12.4 ENCOUNTER FOR SCREENING FOR MALIGNANT NEOPLASM OF CERVIX: ICD-10-CM

## 2024-06-12 DIAGNOSIS — Z11.3 ENCOUNTER FOR SCREENING FOR INFECTIONS WITH A PREDOMINANTLY SEXUAL MODE OF TRANSMISSION: ICD-10-CM

## 2024-06-12 PROCEDURE — 99385 PREV VISIT NEW AGE 18-39: CPT

## 2024-06-12 NOTE — HISTORY OF PRESENT ILLNESS
[Y] : Patient reports abnormal menses [N] : Patient denies prior pregnancies [Menarche Age: ____] : age at menarche was [unfilled] [Previously active] : previously active [Men] : men [FreeTextEntry1] : 6/1/2024

## 2024-06-13 LAB
C TRACH RRNA SPEC QL NAA+PROBE: NOT DETECTED
HPV HIGH+LOW RISK DNA PNL CVX: NOT DETECTED
N GONORRHOEA RRNA SPEC QL NAA+PROBE: NOT DETECTED
SOURCE TP AMPLIFICATION: NORMAL

## 2024-06-19 LAB — CYTOLOGY CVX/VAG DOC THIN PREP: NORMAL

## 2025-06-11 ENCOUNTER — APPOINTMENT (OUTPATIENT)
Dept: OBGYN | Facility: CLINIC | Age: 35
End: 2025-06-11

## 2025-06-11 ENCOUNTER — LABORATORY RESULT (OUTPATIENT)
Age: 35
End: 2025-06-11

## 2025-06-11 VITALS — SYSTOLIC BLOOD PRESSURE: 121 MMHG | OXYGEN SATURATION: 98 % | DIASTOLIC BLOOD PRESSURE: 81 MMHG | HEART RATE: 85 BPM

## 2025-06-11 PROBLEM — N92.6 IRREGULAR MENSTRUAL CYCLE: Status: ACTIVE | Noted: 2025-06-11

## 2025-06-11 PROBLEM — Z13.29 THYROID DISORDER SCREEN: Status: ACTIVE | Noted: 2025-06-11

## 2025-06-11 PROBLEM — R14.0 ABDOMINAL BLOATING: Status: ACTIVE | Noted: 2025-06-11

## 2025-06-11 PROBLEM — R63.5 WEIGHT GAIN: Status: ACTIVE | Noted: 2025-06-11

## 2025-06-11 PROCEDURE — 99395 PREV VISIT EST AGE 18-39: CPT

## 2025-06-11 PROCEDURE — 99459 PELVIC EXAMINATION: CPT | Mod: NC

## 2025-06-11 PROCEDURE — 36415 COLL VENOUS BLD VENIPUNCTURE: CPT

## 2025-06-12 LAB
25(OH)D3 SERPL-MCNC: 45.6 NG/ML
ALBUMIN SERPL ELPH-MCNC: 4.4 G/DL
ALP BLD-CCNC: 63 U/L
ALT SERPL-CCNC: 21 U/L
ANION GAP SERPL CALC-SCNC: 12 MMOL/L
AST SERPL-CCNC: 21 U/L
BASOPHILS # BLD AUTO: 0.06 K/UL
BASOPHILS NFR BLD AUTO: 0.8 %
BILIRUB SERPL-MCNC: 0.4 MG/DL
BUN SERPL-MCNC: 10 MG/DL
C TRACH RRNA SPEC QL NAA+PROBE: NOT DETECTED
CALCIUM SERPL-MCNC: 9.6 MG/DL
CHLORIDE SERPL-SCNC: 103 MMOL/L
CHOLEST SERPL-MCNC: 194 MG/DL
CO2 SERPL-SCNC: 22 MMOL/L
CREAT SERPL-MCNC: 0.74 MG/DL
EGFRCR SERPLBLD CKD-EPI 2021: 108 ML/MIN/1.73M2
EOSINOPHIL # BLD AUTO: 0.14 K/UL
EOSINOPHIL NFR BLD AUTO: 1.9 %
ESTIMATED AVERAGE GLUCOSE: 97 MG/DL
ESTRADIOL SERPL-MCNC: 73 PG/ML
FSH SERPL-MCNC: 4.4 IU/L
GLUCOSE SERPL-MCNC: 96 MG/DL
HBA1C MFR BLD HPLC: 5 %
HCT VFR BLD CALC: 43.9 %
HDLC SERPL-MCNC: 84 MG/DL
HGB BLD-MCNC: 12.6 G/DL
HPV HIGH+LOW RISK DNA PNL CVX: NOT DETECTED
IMM GRANULOCYTES NFR BLD AUTO: 0.4 %
IRON SATN MFR SERPL: 6 %
IRON SERPL-MCNC: 25 UG/DL
LDLC SERPL-MCNC: 101 MG/DL
LH SERPL-ACNC: 2 IU/L
LYMPHOCYTES # BLD AUTO: 1.44 K/UL
LYMPHOCYTES NFR BLD AUTO: 20 %
MAN DIFF?: NORMAL
MCHC RBC-ENTMCNC: 23.2 PG
MCHC RBC-ENTMCNC: 28.7 G/DL
MCV RBC AUTO: 80.7 FL
MONOCYTES # BLD AUTO: 0.5 K/UL
MONOCYTES NFR BLD AUTO: 6.9 %
N GONORRHOEA RRNA SPEC QL NAA+PROBE: NOT DETECTED
NEUTROPHILS # BLD AUTO: 5.04 K/UL
NEUTROPHILS NFR BLD AUTO: 70 %
NONHDLC SERPL-MCNC: 110 MG/DL
PLATELET # BLD AUTO: 263 K/UL
POTASSIUM SERPL-SCNC: 4.4 MMOL/L
PROLACTIN SERPL-MCNC: 7.9 NG/ML
PROT SERPL-MCNC: 7.1 G/DL
RBC # BLD: 5.44 M/UL
RBC # FLD: 23.1 %
SODIUM SERPL-SCNC: 138 MMOL/L
SOURCE TP AMPLIFICATION: NORMAL
TESTOST FREE SERPL-MCNC: 0.3 PG/ML
TESTOST SERPL-MCNC: 2.8 NG/DL
TIBC SERPL-MCNC: 409 UG/DL
TRIGL SERPL-MCNC: 47 MG/DL
TSH SERPL-ACNC: 0.99 UIU/ML
UIBC SERPL-MCNC: 384 UG/DL
VIT B12 SERPL-MCNC: 463 PG/ML
WBC # FLD AUTO: 7.21 K/UL

## 2025-06-14 LAB — CYTOLOGY CVX/VAG DOC THIN PREP: ABNORMAL

## 2025-06-17 LAB — ESTROGEN SERPL-MCNC: 377 PG/ML

## 2025-06-18 PROBLEM — N60.19 FIBROCYSTIC BREAST CHANGES: Status: ACTIVE | Noted: 2025-06-18

## 2025-09-10 ENCOUNTER — TRANSCRIPTION ENCOUNTER (OUTPATIENT)
Age: 35
End: 2025-09-10